# Patient Record
Sex: FEMALE
[De-identification: names, ages, dates, MRNs, and addresses within clinical notes are randomized per-mention and may not be internally consistent; named-entity substitution may affect disease eponyms.]

---

## 2022-07-05 ENCOUNTER — TRANSCRIBE ORDERS (OUTPATIENT)
Dept: OTHER | Age: 22
End: 2022-07-05

## 2022-07-05 DIAGNOSIS — O35.4XX9: Primary | ICD-10-CM

## 2022-07-07 ENCOUNTER — TELEPHONE (OUTPATIENT)
Dept: NEUROPSYCHOLOGY | Facility: CLINIC | Age: 22
End: 2022-07-07

## 2022-07-07 NOTE — TELEPHONE ENCOUNTER
Neuropsych referral declined. Unfortunately, at this time our clinic does not see patients for the retrospective diagnosis of developmental conditions in adults.     Provided ref prov with the following alternative referral options:    -Dr. [email or web address]  [phone]    - [facility] - with locations throughout the [place] area: [phone].    - [facility] - with locations throughout the [place] area: [phone].    - [facility] - with locations throughout the [place] area: [phone].    Kelly Woo  Psychometrist

## 2024-02-15 ENCOUNTER — NURSE TRIAGE (OUTPATIENT)
Dept: NURSING | Facility: CLINIC | Age: 24
End: 2024-02-15
Payer: COMMERCIAL

## 2024-02-15 NOTE — TELEPHONE ENCOUNTER
Should she go to urgent care? Yesterday dog hit her in elbow and it went the opposite way, he ended up moving it forward and there was a pop, pain and tingling. This morning it felt fine. At work she held it like it was in a sling, it hurt bad. Left elbow. She will go to urgent care.  Ivett Corea RN  [facility]        Reason for Disposition   MODERATE pain (e.g., interferes with normal activities) and high-risk adult (e.g., age > 60 years, osteoporosis, chronic steroid use)    Additional Information   Negative: Major bleeding (actively dripping or spurting) that can't be stopped   Negative: Amputation or bone sticking through the skin   Negative: Serious injury with multiple fractures (broken bones)   Negative: Bullet, stabbed by knife or other serious penetrating wound   Negative: Sounds like a life-threatening emergency to the triager   Negative: Wound looks infected   Negative: Shoulder injury   Negative: Hand or wrist injury   Negative: Looks like a broken bone or dislocated joint (crooked or deformed)   Negative: Can't bend injured elbow at all   Negative: Skin is split open or gaping (length > 1/2 inch or 12 mm)   Negative: Bleeding won't stop after 10 minutes of direct pressure (using correct technique)   Negative: Dirt in the wound and not removed after 15 minutes of scrubbing   Negative: Numbness (loss of sensation) of finger(s), present now   Negative: Sounds like a serious injury to the triager   Negative: SEVERE pain (e.g., excruciating)     Pain with use 5, rest @ 1.   Negative: Can't move injured elbow normally (i.e., bend or straighten completely)   Negative: Large swelling or bruise and size > palm of person's hand   Negative: No prior tetanus shots (or is not fully vaccinated) and any wound (e.g., cut or scrape)   Negative: HIV positive or severe immunodeficiency (severely weak immune system) and DIRTY cut or scrape   Negative: Patient wants to be seen    Protocols used: Elbow  Injury-A-OH